# Patient Record
Sex: FEMALE | Race: BLACK OR AFRICAN AMERICAN | NOT HISPANIC OR LATINO | Employment: FULL TIME | URBAN - METROPOLITAN AREA
[De-identification: names, ages, dates, MRNs, and addresses within clinical notes are randomized per-mention and may not be internally consistent; named-entity substitution may affect disease eponyms.]

---

## 2023-07-10 ENCOUNTER — OFFICE VISIT (OUTPATIENT)
Dept: PRIMARY CARE | Facility: CLINIC | Age: 38
End: 2023-07-10
Payer: COMMERCIAL

## 2023-07-10 VITALS
TEMPERATURE: 97.7 F | SYSTOLIC BLOOD PRESSURE: 113 MMHG | HEART RATE: 91 BPM | WEIGHT: 244.3 LBS | BODY MASS INDEX: 39.43 KG/M2 | DIASTOLIC BLOOD PRESSURE: 79 MMHG | OXYGEN SATURATION: 97 %

## 2023-07-10 DIAGNOSIS — Z30.9 ENCOUNTER FOR CONTRACEPTIVE MANAGEMENT, UNSPECIFIED TYPE: Primary | ICD-10-CM

## 2023-07-10 DIAGNOSIS — G40.909 SEIZURE DISORDER (MULTI): ICD-10-CM

## 2023-07-10 DIAGNOSIS — F48.9 POOR MENTAL HEALTH: ICD-10-CM

## 2023-07-10 DIAGNOSIS — F41.9 ANXIETY: ICD-10-CM

## 2023-07-10 PROCEDURE — 1036F TOBACCO NON-USER: CPT

## 2023-07-10 PROCEDURE — 99203 OFFICE O/P NEW LOW 30 MIN: CPT

## 2023-07-10 RX ORDER — LACOSAMIDE 200 MG/1
TABLET ORAL 2 TIMES DAILY
COMMUNITY
Start: 2019-12-10 | End: 2023-12-04 | Stop reason: SDUPTHER

## 2023-07-10 RX ORDER — DESOGESTREL AND ETHINYL ESTRADIOL 0.15-0.03
1 KIT ORAL DAILY
COMMUNITY
Start: 2020-01-08

## 2023-07-10 ASSESSMENT — PAIN SCALES - GENERAL: PAINLEVEL: 0-NO PAIN

## 2023-07-10 NOTE — PATIENT INSTRUCTIONS
Thank you for coming in to see us today, Ms. Carmen Crook! It was a pleasure managing your health together.    Today in clinic, we got to know each other as this was our first appointment with each other. We also talked about the stressors in your life right now and your need for a counselor.    If we ordered bloodwork today, you can get this done at ANY  location and the results will come to me directly. The [facility] here at [facility] are walk-in (no appointment needed); [facility]'s hours are M-F 6:30a-6p and Sat 8a-12p.    If you have any questions/concerns, you can always use Dmailer to message me directly. Or if you prefer, you can call the office at [phone]; if you leave a message, the office staff should translate this to a message in my inbox.    I'm looking forward to seeing you back in clinic in 2 weeks to insert a nexplanon and do a pap smear.     Best,  Dr. Valenzuela

## 2023-07-10 NOTE — PROGRESS NOTES
Subjective   Patient ID: Carmen Crook is a 38 y.o. female who presents for New Patient Visit (Establish care).    HPI  38 year old female here to establish care with a new PCP. patient doing well with concern for life stressors and would like a referral to a therapist at this time.     #stress and anxiety   - states that taking care of 5 children, working full time, and going to nursing school is causing her increased stress.   - endorses guilt at not being able to be the best mother to her children. States that she can be irritable due to stress.   - PHQ-9 -ve     #Hx of seizures  - stated that her first seizure occurred In 2017. Occurred at time her   and pt had increased stress.   - grand mal seizures.   - states that she was initially started on keppra but had recurrent seizures from 9400-1698 q4fjlbph.   - pt switched from keppra to vimpat 200 mg BID. Currently stable on this dose with no seizures since being on this medication.     Previous PCP: Mahi Pavon     OBSTETRIC HISTORY:  G/P:    Abortions: x2  Vaginal Delivery: x 5     GYNECOLOGICAL HISTORY:  Menarche: Lasts 6 days. Light menstruation on day 1-2, heaviest on day 3-5, light on day 6. Uses 5-6 pads/day on heaviest day. Changes tampon q3h.   LMP: 23   Intermenstrual bleeding: no  Pelvic pain: tolerated with ibuprofen   Breast/Ovarian/Uterine CA: denies  Last PAP:   History of Abnormal PAP: denies     PAST MEDICAL HISTORY:  Seizure disorder     PAST SURGICAL HISTORY:  Denies    FAMILY HISTORY:  - mother- recurring DVT/ PE   - sister- uterine fibroids  - maternal grandmother- lung cancer  - cousin on mom's side- cancer behind eye  - 4 year old child with grand mal seizures.     SOCIAL HISTORY:  Tobacco: smoked for a couple of months when she was young but denies otherwise.   ETOH: socially  Drug: prior marijuana use.   Sexual hx: UTI, chlamydia and trichomonas in past.   Occasionally uses condoms. Pt has tried OCP's, IUD,  nexplanon, contraception patch, depo shot. Pills until nexplanon.   Occupation: work in  [employer].     ALLERGIES:  NKDA     RECENT HOSPITALIZATIONS:  - denies     RECENT PROCEDURES:  - denies       Review of Systems  12 point ROS negative except as stated in HPI.     Objective   Physical Exam  Gen: NAD, nontox  HEENT: NCAT. EOMI. MMM.  RESP: no resp distress, talks in full sentences, CTABL  CVS: non-tachycardic, RRR  ABD: Soft, non-distended  Psych: appropriately answers questions. normal mood and affect  MSK: appears to have Full range of motion on all extremities.     Assessment/Plan   38 year old female here to establish care with a new PCP. patient doing well with concern for life stressors and would like a referral to a therapist at this time.     #stress and anxiety   - pt experiencing increased stress in last year with taking care of 5 children, working full time, and going to nursing school.   - pt also states that she has a hx of trauma that she would like to speak to somebody about. States that she was previously seeing therapist which helped.   - no SI/ HI  - referred to in house psychologist Dr. Steinberg for therapy sessions.     #contraception management   - pt states that she occasionally uses condoms with her one sexual partner. Pt does not desire pregnancy at this time.   - states that she has used different contraceptives in the past but would prefer the nexplanon   - pt will make appointment in 2 weeks for nexplanon insertion and for PAP smear.     #seizure disorder   - c/w vimpat 200 mg BID     Discussed with Dr. Matias Valenzuela MD, MPH   Family Medicine and Preventive Health, PGY-2             This note was completed using Dragon voice recognition technology and may include unintended errors with respect to translation of words, typographical errors or grammar errors which may not have been identified while finalizing the chart.        (2) assistive person

## 2023-07-14 ENCOUNTER — APPOINTMENT (OUTPATIENT)
Dept: PRIMARY CARE | Facility: CLINIC | Age: 38
End: 2023-07-14
Payer: COMMERCIAL

## 2023-07-31 NOTE — PROGRESS NOTES
I reviewed with the resident the medical history and the resident’s findings on physical examination.  I discussed with the resident the patient’s diagnosis and concur with the treatment plan as documented in the resident note.     Demar Salcedo MD

## 2023-08-08 ENCOUNTER — TELEPHONE (OUTPATIENT)
Dept: PRIMARY CARE | Facility: CLINIC | Age: 38
End: 2023-08-08
Payer: COMMERCIAL

## 2023-08-08 NOTE — TELEPHONE ENCOUNTER
Pt was called but got voicemail. Left a message stating that pt can call the  and give some availabilities for me to call her back. Will discuss patient's sinus infection at that time.

## 2023-08-09 ENCOUNTER — TELEPHONE (OUTPATIENT)
Dept: PRIMARY CARE | Facility: CLINIC | Age: 38
End: 2023-08-09
Payer: COMMERCIAL

## 2023-09-01 ENCOUNTER — TELEPHONE (OUTPATIENT)
Dept: PRIMARY CARE | Facility: CLINIC | Age: 38
End: 2023-09-01

## 2023-12-04 ENCOUNTER — PHARMACY VISIT (OUTPATIENT)
Dept: PHARMACY | Facility: CLINIC | Age: 38
End: 2023-12-04
Payer: MEDICAID

## 2023-12-04 ENCOUNTER — OFFICE VISIT (OUTPATIENT)
Dept: PRIMARY CARE | Facility: HOSPITAL | Age: 38
End: 2023-12-04
Payer: COMMERCIAL

## 2023-12-04 VITALS
TEMPERATURE: 98.5 F | HEART RATE: 91 BPM | DIASTOLIC BLOOD PRESSURE: 80 MMHG | SYSTOLIC BLOOD PRESSURE: 112 MMHG | OXYGEN SATURATION: 98 %

## 2023-12-04 DIAGNOSIS — J18.9 PNEUMONIA DUE TO INFECTIOUS ORGANISM, UNSPECIFIED LATERALITY, UNSPECIFIED PART OF LUNG: ICD-10-CM

## 2023-12-04 DIAGNOSIS — U07.1 COVID-19: Primary | ICD-10-CM

## 2023-12-04 DIAGNOSIS — J11.1 FLU: ICD-10-CM

## 2023-12-04 DIAGNOSIS — Z30.9 ENCOUNTER FOR CONTRACEPTIVE MANAGEMENT, UNSPECIFIED TYPE: ICD-10-CM

## 2023-12-04 DIAGNOSIS — G40.909 SEIZURE DISORDER (MULTI): ICD-10-CM

## 2023-12-04 PROCEDURE — RXMED WILLOW AMBULATORY MEDICATION CHARGE

## 2023-12-04 PROCEDURE — 87636 SARSCOV2 & INF A&B AMP PRB: CPT

## 2023-12-04 PROCEDURE — 99213 OFFICE O/P EST LOW 20 MIN: CPT | Mod: GC

## 2023-12-04 PROCEDURE — 1036F TOBACCO NON-USER: CPT

## 2023-12-04 PROCEDURE — 99213 OFFICE O/P EST LOW 20 MIN: CPT

## 2023-12-04 PROCEDURE — 99000 SPECIMEN HANDLING OFFICE-LAB: CPT

## 2023-12-04 RX ORDER — ACETAMINOPHEN 500 MG
1000 TABLET ORAL EVERY 8 HOURS PRN
Qty: 30 TABLET | Refills: 0 | Status: SHIPPED | OUTPATIENT
Start: 2023-12-04 | End: 2023-12-14

## 2023-12-04 RX ORDER — BENZONATATE 100 MG/1
100 CAPSULE ORAL 3 TIMES DAILY PRN
Qty: 15 CAPSULE | Refills: 0 | Status: SHIPPED | OUTPATIENT
Start: 2023-12-04 | End: 2023-12-13

## 2023-12-04 RX ORDER — LACOSAMIDE 200 MG/1
200 TABLET ORAL 2 TIMES DAILY
Qty: 30 TABLET | Refills: 2 | Status: SHIPPED | OUTPATIENT
Start: 2023-12-04 | End: 2024-01-24 | Stop reason: SDUPTHER

## 2023-12-04 RX ORDER — IBUPROFEN 800 MG/1
400 TABLET ORAL EVERY 8 HOURS PRN
Qty: 180 TABLET | Refills: 3 | Status: SHIPPED | OUTPATIENT
Start: 2023-12-04 | End: 2024-12-03

## 2023-12-04 NOTE — PROGRESS NOTES
Carmen Crook is a 38 y.o. with PMH of Epilepsy(last seizure in 2021)seen at [facility] on 12/04/23 for flu-like symptoms    Subjective:  Presenting with flu like symptoms-stuffy nose, non-productive cough, aches and pain, cold and chills and headaches started on Saturday 12/02/2023. Headache severity was about 8 took theraflu, so currently no headache, denies any n/v, CP, however has poor appetite  Reports son coughing last week-dry cough,but generally fine.   Had flu shot this year, fully vaccinated, had covid booster in July, hs a previous hx of Covis infection      ROS:   12 point review of system unremarkable except as stated above    Current Outpatient Medications:     desogestreL-ethinyl estradioL (Apri) 0.15-0.03 mg tablet, Take 1 tablet by mouth once daily., Disp: , Rfl:     lacosamide (Vimpat) 200 mg tablet tablet, Take by mouth 2 times a day., Disp: , Rfl:   Pharmacy: [facility]      PHYSICAL EXAM:   General: well appearing, NAD, pleasant and engaged in encounter    HEENT: NCAT, MMM  CV: RRR, no m/r/g, cap r  PULM: CTAB, non-labored respirations, no wheezes/crackles/rhonchi auscultated  ABD: soft, NT, ND, no guarding or rebound tenderness  : no suprapubic or CVA tenderness   EXT: WWP, no significant edema noted in b/l LE's  SKIN: no rashes or lesions noted on trunk or extremities   NEURO: A&Ox4, symmetric facies, no gross motor or sensory deficits, normal gait  PSYCH: pleasant mood, appropriate affect for situation and context    Labs:  Recent Labs     03/24/23  1219 05/17/20  1834 09/25/19  1752   WBC 6.2 9.1 9.0   HGB 12.8 12.1 12.3   HCT 38.5 36.8 38.5    315 276     Recent Labs     03/24/23  1219 05/17/20  1834 09/25/19  1752    142 140   K 4.4 4.0 4.1    106 104   CO2 27 28 25   BUN 10 9 9   CREATININE 0.84 0.82 0.72   GLUCOSE 74 80 93   CALCIUM 9.6 9.7 9.2   PHOS  --   --  3.0       Assessment & Plan:  Carmen MADISON Crook is a 38 y.o. with PMH of Epilepsy seen at [facility] on 12/04/23 for flu like symptoms with concerns for Covid.    # URTI  # Possible Covid vs flu  Pt mildly symptomatic  -Covid test, flu test, follow up labs  -Will treat symptomatically and monitor  -Tylenol 1g tid prn, Motrin 400mh tid prn,   -tessalon perles for cough and sorethroat  -will give 2 day off work duty until results  -Observe isolation precautions    #seizure disorder   - c/w vimpat 200 mg BID       #Health Maintenance  Pt follows up with Family physician Dr Garcia (will want to continue with her)  -to continue health maintenance with Family physician    Addendum  12/5/2023  Pt called about her flu and covid results-they were all negative. Reports she still does not feel better, some mild wheezing however denies SOB, fever, worsening cough. Say she has some remote hx of Asthma however not on albuterol. Will want some few days of to feel better  Plan:  -Counselled to report to the ED if symptoms worse and getting SOB  -c/w symptomatic treatment  -Will do CXR if symptoms get worse and have concerns for PNa  -will give 2 more extra work off duty.    Karen Walker-Dimple  Med-PGY2

## 2023-12-04 NOTE — PATIENT INSTRUCTIONS
Hi Ms Carmen, you presented with flu like symptoms and concerns for covid. You however have mild symptoms and we will do Covid and flu test, We will give you a call when the results come in  We advice you take some fluids, continue with tylenol or theraflu and motrin for joint pains,.  Pls observe isolation precautions until results are negative.  Will give you off work duty for 2 days until results.    It was a pleasure taking care of you.  Continue follow up with your Family physician

## 2023-12-04 NOTE — LETTER
December 5, 2023     Patient: Carmen Crook   YOB: 1985   Date of Visit: 12/4/2023       To Whom It May Concern:    Carmen Crook was seen in my clinic on 12/4/2023 at 3:30 pm. Please excuse Carmen for her absence from work for 2 days 12/5 to 12/6.    If you have any questions or concerns, please don't hesitate to call.         Sincerely,         Karen Culver MD        CC: No Recipients

## 2023-12-04 NOTE — LETTER
December 5, 2023     Patient: Carmen Crook   YOB: 1985   Date of Visit: 12/4/2023       To Whom It May Concern:    Carmen Crook was seen in my clinic on 12/4/2023 at 3:30 pm. Please excuse Carmen for absence from work on 12/5/2023 to 12/7/2023.    If you have any questions or concerns, please don't hesitate to call.         Sincerely,         Karen Culver MD        CC: No Recipients

## 2023-12-05 LAB
FLUAV RNA RESP QL NAA+PROBE: NOT DETECTED
FLUBV RNA RESP QL NAA+PROBE: NOT DETECTED
SARS-COV-2 RNA RESP QL NAA+PROBE: NOT DETECTED

## 2023-12-05 NOTE — PROGRESS NOTES
Telephone note    Pt informed of her covid and flu test results which were negative.  Still not feeling better, will want some more extra work days   -Advised to continue with analgesics and symptomatic management.  - Can report to ED if symptom worsen  -Give excuse duty 12/5-12/7/2023

## 2024-01-24 ENCOUNTER — PHARMACY VISIT (OUTPATIENT)
Dept: PHARMACY | Facility: CLINIC | Age: 39
End: 2024-01-24
Payer: MEDICAID

## 2024-01-24 ENCOUNTER — OFFICE VISIT (OUTPATIENT)
Dept: PRIMARY CARE | Facility: CLINIC | Age: 39
End: 2024-01-24
Payer: COMMERCIAL

## 2024-01-24 VITALS
SYSTOLIC BLOOD PRESSURE: 112 MMHG | TEMPERATURE: 96.6 F | HEART RATE: 73 BPM | BODY MASS INDEX: 38.39 KG/M2 | OXYGEN SATURATION: 98 % | DIASTOLIC BLOOD PRESSURE: 76 MMHG | HEIGHT: 67 IN | WEIGHT: 244.6 LBS

## 2024-01-24 DIAGNOSIS — Z13.220 LIPID SCREENING: ICD-10-CM

## 2024-01-24 DIAGNOSIS — R30.9 PAINFUL URINATION: ICD-10-CM

## 2024-01-24 DIAGNOSIS — R35.0 INCREASED FREQUENCY OF URINATION: ICD-10-CM

## 2024-01-24 DIAGNOSIS — Z13.1 DIABETES MELLITUS SCREENING: ICD-10-CM

## 2024-01-24 DIAGNOSIS — Z11.59 ENCOUNTER FOR HEPATITIS C SCREENING TEST FOR LOW RISK PATIENT: ICD-10-CM

## 2024-01-24 DIAGNOSIS — F41.9 ANXIETY: ICD-10-CM

## 2024-01-24 DIAGNOSIS — G40.909 SEIZURE DISORDER (MULTI): ICD-10-CM

## 2024-01-24 DIAGNOSIS — Z32.00 ENCOUNTER FOR PREGNANCY TEST, RESULT UNKNOWN: Primary | ICD-10-CM

## 2024-01-24 PROBLEM — R51.9 HEADACHE: Status: ACTIVE | Noted: 2024-01-24

## 2024-01-24 LAB
POC APPEARANCE, URINE: CLEAR
POC BILIRUBIN, URINE: NEGATIVE
POC BLOOD, URINE: NEGATIVE
POC COLOR, URINE: YELLOW
POC GLUCOSE, URINE: NEGATIVE MG/DL
POC KETONES, URINE: NEGATIVE MG/DL
POC LEUKOCYTES, URINE: ABNORMAL
POC NITRITE,URINE: NEGATIVE
POC PH, URINE: 7 PH
POC PROTEIN, URINE: NEGATIVE MG/DL
POC SPECIFIC GRAVITY, URINE: 1.02
POC UROBILINOGEN, URINE: 0.2 EU/DL
PREGNANCY TEST URINE, POC: POSITIVE

## 2024-01-24 PROCEDURE — RXMED WILLOW AMBULATORY MEDICATION CHARGE

## 2024-01-24 PROCEDURE — 87661 TRICHOMONAS VAGINALIS AMPLIF: CPT

## 2024-01-24 PROCEDURE — 99213 OFFICE O/P EST LOW 20 MIN: CPT

## 2024-01-24 PROCEDURE — 81025 URINE PREGNANCY TEST: CPT

## 2024-01-24 PROCEDURE — 1036F TOBACCO NON-USER: CPT

## 2024-01-24 PROCEDURE — 81003 URINALYSIS AUTO W/O SCOPE: CPT

## 2024-01-24 RX ORDER — LACOSAMIDE 200 MG/1
200 TABLET ORAL 2 TIMES DAILY
Qty: 30 TABLET | Refills: 2 | Status: SHIPPED | OUTPATIENT
Start: 2024-01-24 | End: 2024-03-09

## 2024-01-24 RX ORDER — ALBUTEROL SULFATE 90 UG/1
2 AEROSOL, METERED RESPIRATORY (INHALATION) EVERY 4 HOURS PRN
COMMUNITY
Start: 2021-06-08

## 2024-01-24 ASSESSMENT — PAIN SCALES - GENERAL: PAINLEVEL: 0-NO PAIN

## 2024-01-24 ASSESSMENT — ENCOUNTER SYMPTOMS
DIAPHORESIS: 1
DYSURIA: 1
FATIGUE: 1
SORE THROAT: 0
FREQUENCY: 1
DIZZINESS: 0
ABDOMINAL PAIN: 1
WEAKNESS: 0
HEADACHES: 0
SHORTNESS OF BREATH: 0
WHEEZING: 0

## 2024-01-24 ASSESSMENT — VISUAL ACUITY: OU: 1

## 2024-01-24 NOTE — PROGRESS NOTES
"Subjective   Patient ID: Carmen Crook is a 38 y.o. female who presents for Abdominal Pain (Suspect she may have a bladder infection, and would like a pregnancy test./Pt would also like a refill for vimpat.).    Ms. Crook is a 39yo F with PMH of epilepsy (managed with vimpat, no seizures in 3 years) who presented today with concern for UTI and possible pregnancy. Patient was sexually active with a new partner at the end of December, endorses no use of contraception. Last menstrual period started 12/16, and she has not menstruated since. Patient is endorsing dysuria, increased urinary frequency, urgency. Pt also endorses some burning with urination and mild lower back pain, as well as nausea and fatigue. Pt denies vomiting, constipation, diarrhea. Patient has had 5 previous pregnancies, all uncomplicated, to term, and delivered vaginally. Patient also expressed desire to be referred to psychologist.     Abdominal Pain  Associated symptoms include dysuria and frequency. Pertinent negatives include no headaches.        Review of Systems   Constitutional:  Positive for diaphoresis and fatigue.   HENT:  Negative for hearing loss and sore throat.    Eyes:  Negative for visual disturbance.   Respiratory:  Negative for shortness of breath and wheezing.    Cardiovascular:  Negative for chest pain and leg swelling.   Gastrointestinal:  Positive for abdominal pain.   Genitourinary:  Positive for dysuria, frequency, menstrual problem and vaginal discharge. Negative for vaginal bleeding and vaginal pain.   Neurological:  Negative for dizziness, weakness and headaches.   Psychiatric/Behavioral:  Negative for suicidal ideas.        Objective   /76 (BP Location: Right arm, Patient Position: Sitting, BP Cuff Size: Adult)   Pulse 73   Temp 35.9 °C (96.6 °F) (Temporal)   Ht 1.702 m (5' 7\")   Wt 111 kg (244 lb 9.6 oz)   SpO2 98%   BMI 38.31 kg/m²     Physical Exam  Constitutional:       General: She is not in acute " distress.  HENT:      Head: Normocephalic and atraumatic.   Eyes:      General: Vision grossly intact.      Extraocular Movements: Extraocular movements intact.      Conjunctiva/sclera: Conjunctivae normal.   Cardiovascular:      Rate and Rhythm: Normal rate and regular rhythm.      Pulses: Normal pulses.      Heart sounds: No murmur heard.     No gallop.   Pulmonary:      Effort: Pulmonary effort is normal. No respiratory distress.      Breath sounds: Normal breath sounds. No stridor. No wheezing.   Chest:      Chest wall: No tenderness.   Abdominal:      General: Abdomen is flat. There is no distension.      Palpations: Abdomen is soft. There is no mass.      Tenderness: There is no abdominal tenderness. There is no guarding.   Musculoskeletal:         General: No tenderness.      Right lower leg: No edema.      Left lower leg: No edema.   Skin:     General: Skin is warm and dry.   Neurological:      General: No focal deficit present.      Mental Status: She is alert and oriented to person, place, and time.   Psychiatric:         Attention and Perception: Attention normal.         Mood and Affect: Mood normal.         Behavior: Behavior normal.         Assessment/Plan   Problem List Items Addressed This Visit    None  Visit Diagnoses       Encounter for pregnancy test, result unknown    -  Primary    Relevant Medications    prenatal vitamin, iron-folic, 27 mg iron-800 mcg folic acid tablet    Other Relevant Orders    POCT Pregnancy, Urine manually resulted (Completed)    CBC and Auto Differential    Seizure disorder (CMS/HCC)        Relevant Medications    lacosamide (Vimpat) 200 mg tablet tablet    Increased frequency of urination        Relevant Orders    POCT UA Automated manually resulted (Completed)    Painful urination        Relevant Orders    POCT UA Automated manually resulted (Completed)    Trichomonas vaginalis, Amplified    C. trachomatis / N. gonorrhoeae, DNA probe    Lipid screening        Relevant  Orders    Lipid panel    Diabetes mellitus screening        Relevant Orders    Hemoglobin A1c    Encounter for hepatitis C screening test for low risk patient        Relevant Orders    Hepatitis C antibody    Anxiety        Relevant Orders    Referral to Psychology          Ms. Crook is a 39yo F with PMH of epilepsy (managed with vimpat, no seizures in 3 years) who presented today with concern for UTI and possible pregnancy. Patient was sexually active with a new partner at the end of December, endorses no use of contraception. Last menstrual period started , and she has not menstruated since. Urine collected in clinic, negative for UTI, positive hcg.     #Pregnancy  #c/f UTI  ::concern for UTI and pregnancy given patients current urinary symptoms and late menstrual cycle, last cycle started   ::patient not currently on any contraception  ::UA negative for UTI  ::urine pregnancy test positive  ::patient expressed wanting to take some time to think about making decision on continuing with pregnancy, counseled on possible options  Plan:  - urine STI panel pending  - patient provided with resources for planned parenthood and   - prenatal vitamin  - CBC  - follow up in 1 week to discuss patients plan    #Epilepsy  ::Diagnosed in  following grandmal seizures  ::Patient was originially put on Keppra, but switched to vimpat due to mood disturbances caused by keppra  ::epilepsy well controlled, no seizures since   Plan:  - refill vimpat  - follow up with neurology    #Psych concerns  ::Patient requesting referral to psychologist    #Screening  - ordered A1c and Hep C labs    I was present with the medical student for the service. I personally verified performed the physical examination and medical decision making. I have verified all of the medical student’s documentation for this encounter and made edits as necessary.     Pt seen and discussed with Dr. Chilango Vernon MD  Family Medicine -  PGY 1

## 2024-01-25 LAB — T VAGINALIS RRNA SPEC QL NAA+PROBE: POSITIVE

## 2024-01-26 DIAGNOSIS — A59.9 TRICHOMONAS VAGINALIS INFECTION: Primary | ICD-10-CM

## 2024-01-26 DIAGNOSIS — Z3A.01 LESS THAN 8 WEEKS GESTATION OF PREGNANCY (HHS-HCC): ICD-10-CM

## 2024-01-26 PROCEDURE — RXMED WILLOW AMBULATORY MEDICATION CHARGE

## 2024-01-26 RX ORDER — METRONIDAZOLE 500 MG/1
500 TABLET ORAL 2 TIMES DAILY
Qty: 14 TABLET | Refills: 0 | Status: SHIPPED | OUTPATIENT
Start: 2024-01-26 | End: 2024-02-05

## 2024-01-26 RX ORDER — ONDANSETRON 4 MG/1
4 TABLET, ORALLY DISINTEGRATING ORAL EVERY 8 HOURS PRN
Qty: 20 TABLET | Refills: 0 | Status: SHIPPED | OUTPATIENT
Start: 2024-01-26 | End: 2024-02-05

## 2024-01-26 NOTE — PROGRESS NOTES
Spoke to patient on the phone to discuss recent lab testing. Discussed positive Trichomonas test and need for antibiotics. Pt agreeable and prescription sent for metronidazole, as well as additional STI testing. Pt additionally requesting medication for nausea, will be sent Zofran.     Pt states she will reach out within one week to follow up.     Vu Vernon MD  Family Medicine - PGY 1

## 2024-01-29 ENCOUNTER — PHARMACY VISIT (OUTPATIENT)
Dept: PHARMACY | Facility: CLINIC | Age: 39
End: 2024-01-29
Payer: MEDICAID

## 2024-01-29 NOTE — PROGRESS NOTES
I saw and evaluated the patient. I personally obtained the key and critical portions of the history and physical exam or was physically present for key and critical portions performed by the resident/fellow. I reviewed the resident/fellow's documentation and discussed the patient with the resident/fellow. I agree with the resident/fellow's medical decision making as documented in the note with the exception/addition of the following:    If patient decides to continue pregnancy, need for Vimpat will require enlisting in national registry since inadequate nkechi data is availab;e to assess frisks of its use. We will continue to follow up with patient and refer to maternal-fetal health is she elects to continue. Neurology/epiliepsy referral would also be helpful if alternative to Vimpat is required.       Lashay Kee MD

## 2024-02-07 ENCOUNTER — TELEPHONE (OUTPATIENT)
Dept: PRIMARY CARE | Facility: CLINIC | Age: 39
End: 2024-02-07

## 2024-02-07 NOTE — TELEPHONE ENCOUNTER
Patient called and requested a callback about her medication she stated that its not working correctly and wanted to address some concerns about this please give the patient a callback about this matter [phone]

## 2024-02-11 ENCOUNTER — APPOINTMENT (OUTPATIENT)
Dept: RADIOLOGY | Facility: HOSPITAL | Age: 39
End: 2024-02-11
Payer: COMMERCIAL

## 2024-02-11 ENCOUNTER — CLINICAL SUPPORT (OUTPATIENT)
Dept: EMERGENCY MEDICINE | Facility: HOSPITAL | Age: 39
End: 2024-02-11
Payer: COMMERCIAL

## 2024-02-11 ENCOUNTER — HOSPITAL ENCOUNTER (EMERGENCY)
Facility: HOSPITAL | Age: 39
Discharge: HOME | End: 2024-02-11
Payer: COMMERCIAL

## 2024-02-11 VITALS
HEART RATE: 91 BPM | OXYGEN SATURATION: 97 % | HEIGHT: 67 IN | RESPIRATION RATE: 18 BRPM | DIASTOLIC BLOOD PRESSURE: 85 MMHG | TEMPERATURE: 97.7 F | WEIGHT: 218 LBS | SYSTOLIC BLOOD PRESSURE: 120 MMHG | BODY MASS INDEX: 34.21 KG/M2

## 2024-02-11 DIAGNOSIS — O23.41 URINARY TRACT INFECTION IN MOTHER DURING FIRST TRIMESTER OF PREGNANCY (HHS-HCC): ICD-10-CM

## 2024-02-11 DIAGNOSIS — O21.9 NAUSEA AND VOMITING DURING PREGNANCY (HHS-HCC): Primary | ICD-10-CM

## 2024-02-11 LAB
ALBUMIN SERPL BCP-MCNC: 4.2 G/DL (ref 3.4–5)
ALP SERPL-CCNC: 46 U/L (ref 33–110)
ALT SERPL W P-5'-P-CCNC: 7 U/L (ref 7–45)
ANION GAP SERPL CALC-SCNC: 16 MMOL/L (ref 10–20)
APPEARANCE UR: ABNORMAL
AST SERPL W P-5'-P-CCNC: 11 U/L (ref 9–39)
ATRIAL RATE: 72 BPM
BACTERIA #/AREA URNS AUTO: ABNORMAL /HPF
BASOPHILS # BLD AUTO: 0.03 X10*3/UL (ref 0–0.1)
BASOPHILS NFR BLD AUTO: 0.3 %
BILIRUB SERPL-MCNC: 0.8 MG/DL (ref 0–1.2)
BILIRUB UR STRIP.AUTO-MCNC: NEGATIVE MG/DL
BUN SERPL-MCNC: 8 MG/DL (ref 6–23)
CALCIUM SERPL-MCNC: 10.3 MG/DL (ref 8.6–10.6)
CARDIAC TROPONIN I PNL SERPL HS: 5 NG/L (ref 0–34)
CHLORIDE SERPL-SCNC: 98 MMOL/L (ref 98–107)
CO2 SERPL-SCNC: 19 MMOL/L (ref 21–32)
COLOR UR: YELLOW
CREAT SERPL-MCNC: 0.69 MG/DL (ref 0.5–1.05)
EGFRCR SERPLBLD CKD-EPI 2021: >90 ML/MIN/1.73M*2
EOSINOPHIL # BLD AUTO: 0.04 X10*3/UL (ref 0–0.7)
EOSINOPHIL NFR BLD AUTO: 0.4 %
ERYTHROCYTE [DISTWIDTH] IN BLOOD BY AUTOMATED COUNT: 11.7 % (ref 11.5–14.5)
GLUCOSE SERPL-MCNC: 84 MG/DL (ref 74–99)
GLUCOSE UR STRIP.AUTO-MCNC: NORMAL MG/DL
HCG UR QL IA.RAPID: POSITIVE
HCT VFR BLD AUTO: 39.4 % (ref 36–46)
HGB BLD-MCNC: 14.2 G/DL (ref 12–16)
HYALINE CASTS #/AREA URNS AUTO: ABNORMAL /LPF
IMM GRANULOCYTES # BLD AUTO: 0.05 X10*3/UL (ref 0–0.7)
IMM GRANULOCYTES NFR BLD AUTO: 0.5 % (ref 0–0.9)
KETONES UR STRIP.AUTO-MCNC: ABNORMAL MG/DL
LEUKOCYTE ESTERASE UR QL STRIP.AUTO: ABNORMAL
LIPASE SERPL-CCNC: 27 U/L (ref 9–82)
LYMPHOCYTES # BLD AUTO: 2.28 X10*3/UL (ref 1.2–4.8)
LYMPHOCYTES NFR BLD AUTO: 22.9 %
MCH RBC QN AUTO: 31.2 PG (ref 26–34)
MCHC RBC AUTO-ENTMCNC: 36 G/DL (ref 32–36)
MCV RBC AUTO: 87 FL (ref 80–100)
MONOCYTES # BLD AUTO: 0.65 X10*3/UL (ref 0.1–1)
MONOCYTES NFR BLD AUTO: 6.5 %
MUCOUS THREADS #/AREA URNS AUTO: ABNORMAL /LPF
NEUTROPHILS # BLD AUTO: 6.9 X10*3/UL (ref 1.2–7.7)
NEUTROPHILS NFR BLD AUTO: 69.4 %
NITRITE UR QL STRIP.AUTO: NEGATIVE
NRBC BLD-RTO: 0 /100 WBCS (ref 0–0)
P AXIS: 37 DEGREES
P OFFSET: 190 MS
P ONSET: 153 MS
PH UR STRIP.AUTO: 6 [PH]
PLATELET # BLD AUTO: 230 X10*3/UL (ref 150–450)
POTASSIUM SERPL-SCNC: 3.4 MMOL/L (ref 3.5–5.3)
PR INTERVAL: 140 MS
PROT SERPL-MCNC: 8.8 G/DL (ref 6.4–8.2)
PROT UR STRIP.AUTO-MCNC: ABNORMAL MG/DL
Q ONSET: 223 MS
QRS COUNT: 12 BEATS
QRS DURATION: 62 MS
QT INTERVAL: 388 MS
QTC CALCULATION(BAZETT): 424 MS
QTC FREDERICIA: 412 MS
R AXIS: 89 DEGREES
RBC # BLD AUTO: 4.55 X10*6/UL (ref 4–5.2)
RBC # UR STRIP.AUTO: ABNORMAL /UL
RBC #/AREA URNS AUTO: ABNORMAL /HPF
SODIUM SERPL-SCNC: 130 MMOL/L (ref 136–145)
SP GR UR STRIP.AUTO: 1.03
SQUAMOUS #/AREA URNS AUTO: ABNORMAL /HPF
T AXIS: 80 DEGREES
T OFFSET: 417 MS
UROBILINOGEN UR STRIP.AUTO-MCNC: ABNORMAL MG/DL
VENTRICULAR RATE: 72 BPM
WBC # BLD AUTO: 10 X10*3/UL (ref 4.4–11.3)
WBC #/AREA URNS AUTO: ABNORMAL /HPF

## 2024-02-11 PROCEDURE — 36415 COLL VENOUS BLD VENIPUNCTURE: CPT | Performed by: NURSE PRACTITIONER

## 2024-02-11 PROCEDURE — 81001 URINALYSIS AUTO W/SCOPE: CPT | Performed by: NURSE PRACTITIONER

## 2024-02-11 PROCEDURE — 99284 EMERGENCY DEPT VISIT MOD MDM: CPT | Mod: 25

## 2024-02-11 PROCEDURE — 76801 OB US < 14 WKS SINGLE FETUS: CPT

## 2024-02-11 PROCEDURE — 80053 COMPREHEN METABOLIC PANEL: CPT | Performed by: NURSE PRACTITIONER

## 2024-02-11 PROCEDURE — 83690 ASSAY OF LIPASE: CPT | Performed by: NURSE PRACTITIONER

## 2024-02-11 PROCEDURE — 99285 EMERGENCY DEPT VISIT HI MDM: CPT | Performed by: NURSE PRACTITIONER

## 2024-02-11 PROCEDURE — 84484 ASSAY OF TROPONIN QUANT: CPT | Performed by: NURSE PRACTITIONER

## 2024-02-11 PROCEDURE — 85025 COMPLETE CBC W/AUTO DIFF WBC: CPT | Performed by: NURSE PRACTITIONER

## 2024-02-11 PROCEDURE — 81025 URINE PREGNANCY TEST: CPT | Performed by: NURSE PRACTITIONER

## 2024-02-11 PROCEDURE — 76815 OB US LIMITED FETUS(S): CPT | Performed by: RADIOLOGY

## 2024-02-11 PROCEDURE — 87086 URINE CULTURE/COLONY COUNT: CPT | Performed by: NURSE PRACTITIONER

## 2024-02-11 PROCEDURE — 2500000002 HC RX 250 W HCPCS SELF ADMINISTERED DRUGS (ALT 637 FOR MEDICARE OP, ALT 636 FOR OP/ED): Mod: SE | Performed by: NURSE PRACTITIONER

## 2024-02-11 PROCEDURE — 76817 TRANSVAGINAL US OBSTETRIC: CPT | Performed by: RADIOLOGY

## 2024-02-11 PROCEDURE — 93005 ELECTROCARDIOGRAM TRACING: CPT

## 2024-02-11 PROCEDURE — 96375 TX/PRO/DX INJ NEW DRUG ADDON: CPT

## 2024-02-11 PROCEDURE — 2500000004 HC RX 250 GENERAL PHARMACY W/ HCPCS (ALT 636 FOR OP/ED): Mod: SE | Performed by: NURSE PRACTITIONER

## 2024-02-11 PROCEDURE — 96365 THER/PROPH/DIAG IV INF INIT: CPT

## 2024-02-11 PROCEDURE — 99285 EMERGENCY DEPT VISIT HI MDM: CPT | Mod: 25

## 2024-02-11 RX ORDER — CEFTRIAXONE 1 G/50ML
1 INJECTION, SOLUTION INTRAVENOUS ONCE
Status: COMPLETED | OUTPATIENT
Start: 2024-02-11 | End: 2024-02-11

## 2024-02-11 RX ORDER — POTASSIUM CHLORIDE 20 MEQ/1
40 TABLET, EXTENDED RELEASE ORAL ONCE
Status: COMPLETED | OUTPATIENT
Start: 2024-02-11 | End: 2024-02-11

## 2024-02-11 RX ORDER — METOCLOPRAMIDE HYDROCHLORIDE 5 MG/ML
10 INJECTION INTRAMUSCULAR; INTRAVENOUS ONCE
Status: COMPLETED | OUTPATIENT
Start: 2024-02-11 | End: 2024-02-11

## 2024-02-11 RX ORDER — METOCLOPRAMIDE 10 MG/1
10 TABLET ORAL EVERY 6 HOURS PRN
Qty: 12 TABLET | Refills: 0 | Status: SHIPPED | OUTPATIENT
Start: 2024-02-11 | End: 2024-02-14

## 2024-02-11 RX ORDER — CEPHALEXIN 500 MG/1
500 CAPSULE ORAL 4 TIMES DAILY
Qty: 28 CAPSULE | Refills: 0 | Status: SHIPPED | OUTPATIENT
Start: 2024-02-11 | End: 2024-02-18

## 2024-02-11 RX ORDER — PROMETHAZINE HYDROCHLORIDE 25 MG/1
25 TABLET ORAL EVERY 6 HOURS PRN
Qty: 12 TABLET | Refills: 0 | Status: SHIPPED | OUTPATIENT
Start: 2024-02-11 | End: 2024-02-14

## 2024-02-11 RX ADMIN — PROMETHAZINE HYDROCHLORIDE 12.5 MG: 25 INJECTION INTRAMUSCULAR; INTRAVENOUS at 16:26

## 2024-02-11 RX ADMIN — METOCLOPRAMIDE 10 MG: 5 INJECTION, SOLUTION INTRAMUSCULAR; INTRAVENOUS at 16:27

## 2024-02-11 RX ADMIN — CEFTRIAXONE SODIUM 1 G: 1 INJECTION, SOLUTION INTRAVENOUS at 18:38

## 2024-02-11 RX ADMIN — SODIUM CHLORIDE 2000 ML: 9 INJECTION, SOLUTION INTRAVENOUS at 16:27

## 2024-02-11 RX ADMIN — POTASSIUM CHLORIDE 40 MEQ: 1500 TABLET, EXTENDED RELEASE ORAL at 18:39

## 2024-02-11 ASSESSMENT — COLUMBIA-SUICIDE SEVERITY RATING SCALE - C-SSRS
2. HAVE YOU ACTUALLY HAD ANY THOUGHTS OF KILLING YOURSELF?: NO
1. IN THE PAST MONTH, HAVE YOU WISHED YOU WERE DEAD OR WISHED YOU COULD GO TO SLEEP AND NOT WAKE UP?: NO
6. HAVE YOU EVER DONE ANYTHING, STARTED TO DO ANYTHING, OR PREPARED TO DO ANYTHING TO END YOUR LIFE?: NO

## 2024-02-11 ASSESSMENT — ENCOUNTER SYMPTOMS
VOMITING: 1
ABDOMINAL PAIN: 1
NAUSEA: 1

## 2024-02-11 ASSESSMENT — PAIN - FUNCTIONAL ASSESSMENT: PAIN_FUNCTIONAL_ASSESSMENT: 0-10

## 2024-02-11 ASSESSMENT — PAIN SCALES - GENERAL: PAINLEVEL_OUTOF10: 5 - MODERATE PAIN

## 2024-02-11 NOTE — ED PROVIDER NOTES
Emergency Department Encounter  [facility] EMERGENCY MEDICINE    Patient: Carmen Crook  MRN: [id number]  : 1985  Date of Evaluation: 2024  ED Provider: MARIA L Vallejo      Chief Complaint       Chief Complaint   Patient presents with    Pregnancy Problem        Limitations to History: none  Historian: patient  Records reviewed: EMR inpatient and outpatient notes, Care Everywhere    This is a 38-year-old female with a PMH of epilepsy who presents to the emergency room with a headache, nausea, vomiting and abdominal cramping.  Patient states that she found out she was pregnant on 2024.  She reports she developed nausea and vomiting just prior to finding out she was pregnant.  She endorses a 20 pound weight loss in the last 3 weeks.  Patient states that she passed out earlier today but does not think that she hit her head.  Patient states that she has been vomiting over 5 episodes a day. Denies any chest pain or sob. Denies any vaginal discharge or bleeding. Patient states she has not been able to tolerate food or drink in days.    PMH: Epilepsy  LMP: 23    PSH: Denies  Allergies: NKDA   Social HX: Denies smoking, alcohol or drug use.  Family HX: No family history pertinent to current presenting problem  Medications: Reviewed per EMR    ROS:     Review of Systems   Gastrointestinal:  Positive for abdominal pain, nausea and vomiting.   Neurological:  Positive for syncope.     14 systems reviewed and otherwise acutely negative except as in the [place].        Past History     Past Medical History:   Diagnosis Date    27 weeks gestation of pregnancy 2019    27 weeks gestation of pregnancy    Anemia complicating pregnancy, unspecified trimester 2019    Anemia in pregnancy    Chlamydial infection, unspecified 2016    Chlamydia infection    Diseases of the nervous system complicating pregnancy, unspecified trimester 2019    Seizure disorder  in pregnancy    Encounter for full-term uncomplicated delivery      (normal spontaneous vaginal delivery)    Encounter for other screening for genetic and chromosomal anomalies 2020    Genetic screening    Encounter for screening for infections with a predominantly sexual mode of transmission 2019    Routine screening for STI (sexually transmitted infection)    Encounter for screening for infections with a predominantly sexual mode of transmission 07/15/2016    Routine screening for STI (sexually transmitted infection)    Encounter for screening for malignant neoplasm of cervix 2016    Cervical cancer screening    Encounter for supervision of other normal pregnancy, first trimester 2019    Prenatal care, subsequent pregnancy in first trimester    Encounter for supervision of other normal pregnancy, second trimester 2019    Encounter for supervision of other normal pregnancy in second trimester    Encounter for supervision of other normal pregnancy, second trimester 2019    Prenatal care, subsequent pregnancy in second trimester    Gestational edema, unspecified trimester 2019    Swelling of lower extremity during pregnancy, antepartum    Irregular menstruation, unspecified 2019    Missed menses    Less than 8 weeks gestation of pregnancy 2016    Less than 8 weeks gestation of pregnancy    Mild hyperemesis gravidarum     Hyperemesis gravidarum    Other problems related to lifestyle 2016    Other problems related to lifestyle    Other specified personal risk factors, not elsewhere classified 2016    At risk of UTI    Other specified pregnancy related conditions, unspecified trimester 2016    Rh negative status during pregnancy    Personal history of other diseases of the digestive system 2019    History of constipation    Personal history of other diseases of the female genital tract 07/15/2016    History of amenorrhea    Personal  history of other specified conditions 06/13/2016    History of dysuria    Personal history of other specified conditions 02/11/2019    History of seizures    Unspecified asthma, uncomplicated     Childhood asthma    Unspecified asthma, uncomplicated 07/15/2016    Childhood asthma    Urinary tract infection, site not specified 06/13/2016    Acute urinary tract infection    Vomiting of pregnancy, unspecified 02/11/2019    Nausea and vomiting in pregnancy     No past surgical history on file.      Medications/Allergies     Previous Medications    ALBUTEROL 90 MCG/ACTUATION INHALER    Inhale 2 puffs every 4 hours if needed.    DESOGESTREL-ETHINYL ESTRADIOL (APRI) 0.15-0.03 MG TABLET    Take 1 tablet by mouth once daily.    IBUPROFEN 800 MG TABLET    Take 0.5 tablets (400 mg) by mouth every 8 hours if needed for mild pain (1 - 3) (pain).    LACOSAMIDE (VIMPAT) 200 MG TABLET TABLET    Take 1 tablet (200 mg) by mouth 2 times a day.    PRENATAL VITAMIN, IRON-FOLIC, 27 MG IRON-800 MCG FOLIC ACID TABLET    Take 1 tablet by mouth once daily.     No Known Allergies     Physical Exam       ED Triage Vitals [02/11/24 1500]   Temperature Heart Rate Respirations BP   36.5 °C (97.7 °F) 91 18 120/85      Pulse Ox Temp Source Heart Rate Source Patient Position   97 % Temporal -- --      BP Location FiO2 (%)     -- --       Physical Exam:    Appearance: Alert, oriented , cooperative,  in no acute distress. Well nourished & well hydrated.    Skin: Intact,  dry skin, no lesions, rash, petechiae or purpura.     Eyes: PERRLA, EOMs intact,  Conjunctiva pink with no redness or exudates.     ENT: Hearing grossly intact. External auditory canals patent, tympanic membranes intact with visible landmarks.     Neck: Supple, without meningismus.    Pulmonary: Clear bilaterally with good chest wall excursion. No rales, rhonchi or wheezing. No accessory muscle use or stridor.    Cardiac: Normal S1, S2 without murmur, rub, gallop or extrasystole.      Abdomen: Soft, nontender, active bowel sounds.  No palpable organomegaly.  No rebound or guarding.      Musculoskeletal: Full range of motion. no pain, edema, or deformity. Pulses full and equal. No cyanosis, clubbing, or edema.    Neurological: Normal sensation, no weakness, no focal findings identified.    Psychiatric: Appropriate mood and affect.       Diagnostics   Labs:  Results for orders placed or performed during the hospital encounter of 02/11/24 (from the past 24 hour(s))   Urinalysis with Reflex Culture and Microscopic   Result Value Ref Range    Color, Urine Yellow Light-Yellow, Yellow, Dark-Yellow    Appearance, Urine Turbid (N) Clear    Specific Gravity, Urine 1.029 1.005 - 1.035    pH, Urine 6.0 5.0, 5.5, 6.0, 6.5, 7.0, 7.5, 8.0    Protein, Urine 70 (1+) (A) NEGATIVE, 10 (TRACE), 20 (TRACE) mg/dL    Glucose, Urine Normal Normal mg/dL    Blood, Urine 0.06 (1+) (A) NEGATIVE    Ketones, Urine OVER (4+) (A) NEGATIVE mg/dL    Bilirubin, Urine NEGATIVE NEGATIVE    Urobilinogen, Urine 3 (1+) (A) Normal mg/dL    Nitrite, Urine NEGATIVE NEGATIVE    Leukocyte Esterase, Urine 500 Shaquille/µL (A) NEGATIVE   Microscopic Only, Urine   Result Value Ref Range    WBC, Urine 11-20 (A) 1-5, NONE /HPF    RBC, Urine 6-10 (A) NONE, 1-2, 3-5 /HPF    Squamous Epithelial Cells, Urine 10-25 (FEW) Reference range not established. /HPF    Bacteria, Urine 4+ (A) NONE SEEN /HPF    Mucus, Urine 4+ Reference range not established. /LPF    Hyaline Casts, Urine OCCASIONAL (A) NONE /LPF   Lipase   Result Value Ref Range    Lipase 27 9 - 82 U/L   Comprehensive Metabolic Panel   Result Value Ref Range    Glucose 84 74 - 99 mg/dL    Sodium 130 (L) 136 - 145 mmol/L    Potassium 3.4 (L) 3.5 - 5.3 mmol/L    Chloride 98 98 - 107 mmol/L    Bicarbonate 19 (L) 21 - 32 mmol/L    Anion Gap 16 10 - 20 mmol/L    Urea Nitrogen 8 6 - 23 mg/dL    Creatinine 0.69 0.50 - 1.05 mg/dL    eGFR >90 >60 mL/min/1.73m*2    Calcium 10.3 8.6 - 10.6 mg/dL    Albumin  "4.2 3.4 - 5.0 g/dL    Alkaline Phosphatase 46 33 - 110 U/L    Total Protein 8.8 (H) 6.4 - 8.2 g/dL    AST 11 9 - 39 U/L    Bilirubin, Total 0.8 0.0 - 1.2 mg/dL    ALT 7 7 - 45 U/L   CBC and Auto Differential   Result Value Ref Range    WBC 10.0 4.4 - 11.3 x10*3/uL    nRBC 0.0 0.0 - 0.0 /100 WBCs    RBC 4.55 4.00 - 5.20 x10*6/uL    Hemoglobin 14.2 12.0 - 16.0 g/dL    Hematocrit 39.4 36.0 - 46.0 %    MCV 87 80 - 100 fL    MCH 31.2 26.0 - 34.0 pg    MCHC 36.0 32.0 - 36.0 g/dL    RDW 11.7 11.5 - 14.5 %    Platelets 230 150 - 450 x10*3/uL    Neutrophils % 69.4 40.0 - 80.0 %    Immature Granulocytes %, Automated 0.5 0.0 - 0.9 %    Lymphocytes % 22.9 13.0 - 44.0 %    Monocytes % 6.5 2.0 - 10.0 %    Eosinophils % 0.4 0.0 - 6.0 %    Basophils % 0.3 0.0 - 2.0 %    Neutrophils Absolute 6.90 1.20 - 7.70 x10*3/uL    Immature Granulocytes Absolute, Automated 0.05 0.00 - 0.70 x10*3/uL    Lymphocytes Absolute 2.28 1.20 - 4.80 x10*3/uL    Monocytes Absolute 0.65 0.10 - 1.00 x10*3/uL    Eosinophils Absolute 0.04 0.00 - 0.70 x10*3/uL    Basophils Absolute 0.03 0.00 - 0.10 x10*3/uL   Troponin I, High Sensitivity   Result Value Ref Range    Troponin I, High Sensitivity 5 0 - 34 ng/L   hCG, Urine, Qualitative   Result Value Ref Range    HCG, Urine POSITIVE (A) NEGATIVE      Radiographs:  US PELVIS OB TRANSABDOMINAL W TRANSVAGINAL UP TO 1ST TRIMESTER    (Results Pending)       Procedures:   Procedures     EKG: interpreted by this provider  Time: 1636  Indication: NSR  Rate: 72  Rhythm: NSR        Assessment   In brief, Carmen Crook is a 38 y.o. female who presented to the emergency department with nausea and vomiting.          ED Course/MDM   Visit Vitals  /85   Pulse 91   Temp 36.5 °C (97.7 °F) (Temporal)   Resp 18   Ht 1.702 m (5' 7\")   Wt 98.9 kg (218 lb)   SpO2 97%   BMI 34.14 kg/m²   OB Status Having periods   Smoking Status Never   BSA 2.16 m²       Medications   cefTRIAXone (Rocephin) IVPB 1 g (has no administration in " time range)   potassium chloride CR (Klor-Con M20) ER tablet 40 mEq (has no administration in time range)   sodium chloride 0.9 % bolus 2,000 mL (2,000 mL intravenous New Bag 2/11/24 1627)   metoclopramide (Reglan) injection 10 mg (10 mg intravenous Given 2/11/24 1627)   promethazine (Phenergan) 12.5 mg in sodium chloride 0.9% 50 mL IV (12.5 mg intravenous New Bag 2/11/24 1626)       Patient remained stable while in the emergency department. Previous outpatient and ED records were reviewed. Outside records were reviewed.  Urine pregnancy test was positive.  IV established and labs obtained.  CBC was unremarkable.  Comprehensive metabolic panel with a potassium of 3.4, otherwise unremarkable.  Patient received oral potassium 40 mEq.  Troponin was 5.  Urinalysis shows a large amount of leukocytes 500, 11-20 white cells and 4+ bacteria.  Patient received 1 L of IV fluids, and ceftriaxone 1 g IV.  Patient declined IV Zofran and wanted something else besides Zofran for nausea vomiting.  Patient received Reglan 10 mg IV and Phenergan.  OB ultrasound was ordered, pending results at this time.  Plan is to reevaluate the patient to see if she is feeling better.    Final Impression    No diagnosis found.      DISPOSITION  Disposition: Signed out to Alba Rahman PA-C    Comment: Please note this report has been produced using speech recognition software and may contain errors related to that system including errors in grammar, punctuation, and spelling, as well as words and phrases that may be inappropriate.  If there are any questions or concerns please feel free to contact the dictating provider for clarification.    MARIA L Vallejo APRN-CNP  02/11/24 5751

## 2024-02-11 NOTE — ED TRIAGE NOTES
Pt to ED with c/o HA, n/v, cramping with no vaginal bleeding, loss of 20lbs since 1/24. Pt reports passing out in the bathroom. Confirmed pregnancy during ED visit and was seen with OB.

## 2024-02-12 LAB — HOLD SPECIMEN: NORMAL

## 2024-02-12 NOTE — PROGRESS NOTES
Emergency Medicine Transition of Care Note.    I received Carmen Crook in signout from LENIN Crocker.  Please see the previous ED provider note for all HPI, PE and MDM up to the time of signout at 1900. This is in addition to the primary record.    In brief Carmen Crook is an 38 y.o. female presenting for   Chief Complaint   Patient presents with    Pregnancy Problem     At the time of signout we were awaiting: US results and PO challenge.  Patient presents for positive pregnancy test, decreased oral intake over the past month that is caused weight loss.  Patient's UA does appear infected, urine cultures added on for completeness.  She is given IV Rocephin here as empiric treatment.  Pelvic ultrasound reveals single live IUP of 8 weeks and 0 days, benign appearing. Hypokalemia was replaced prior to discharge. Able to tolerate oral intake. Rx for reglan and promethazine as needed for n/v, daily prenatals, keflex for UTI. Take all antibiotics, as prescribed, until completely gone - even if you are feeling better. Referral placed for OB followup. Provided with 24 hour pharmacy list at discharge.    Diagnostics     Labs Reviewed   COMPREHENSIVE METABOLIC PANEL - Abnormal       Result Value    Glucose 84      Sodium 130 (*)     Potassium 3.4 (*)     Chloride 98      Bicarbonate 19 (*)     Anion Gap 16      Urea Nitrogen 8      Creatinine 0.69      eGFR >90      Calcium 10.3      Albumin 4.2      Alkaline Phosphatase 46      Total Protein 8.8 (*)     AST 11      Bilirubin, Total 0.8      ALT 7     URINALYSIS WITH REFLEX CULTURE AND MICROSCOPIC - Abnormal    Color, Urine Yellow      Appearance, Urine Turbid (*)     Specific Gravity, Urine 1.029      pH, Urine 6.0      Protein, Urine 70 (1+) (*)     Glucose, Urine Normal      Blood, Urine 0.06 (1+) (*)     Ketones, Urine OVER (4+) (*)     Bilirubin, Urine NEGATIVE      Urobilinogen, Urine 3 (1+) (*)     Nitrite, Urine NEGATIVE      Leukocyte Esterase, Urine 500 Shaquille/µL  (*)    HCG, URINE, QUALITATIVE - Abnormal    HCG, Urine POSITIVE (*)    MICROSCOPIC ONLY, URINE - Abnormal    WBC, Urine 11-20 (*)     RBC, Urine 6-10 (*)     Squamous Epithelial Cells, Urine 10-25 (FEW)      Bacteria, Urine 4+ (*)     Mucus, Urine 4+      Hyaline Casts, Urine OCCASIONAL (*)    LIPASE - Normal    Lipase 27      Narrative:     Venipuncture immediately after or during the administration of Metamizole may lead to falsely low results. Testing should be performed immediately prior to Metamizole dosing.   TROPONIN I, HIGH SENSITIVITY - Normal    Troponin I, High Sensitivity 5      Narrative:     Less than 99th percentile of normal range cutoff-  Female and children under 18 years old <35 ng/L; Male <54 ng/L: Negative  Repeat testing should be performed if clinically indicated.     Female and children under 18 years old  ng/L; Male  ng/L:  Consistent with possible cardiac damage and possible increased clinical   risk. Serial measurements may help to assess extent of myocardial damage.     >120 ng/L: Consistent with cardiac damage, increased clinical risk and  myocardial infarction. Serial measurements may help assess extent of   myocardial damage.      NOTE: Children less than 1 year old may have higher baseline troponin   levels and results should be interpreted in conjunction with the overall   clinical context.    NOTE: Troponin I testing is performed using a different   testing methodology at [facility] than at other   [facility] hospitals. Direct result comparisons should only   be made within the same method.     URINE CULTURE   CBC WITH AUTO DIFFERENTIAL    WBC 10.0      nRBC 0.0      RBC 4.55      Hemoglobin 14.2      Hematocrit 39.4      MCV 87      MCH 31.2      MCHC 36.0      RDW 11.7      Platelets 230      Neutrophils % 69.4      Immature Granulocytes %, Automated 0.5      Lymphocytes % 22.9      Monocytes % 6.5      Eosinophils % 0.4      Basophils % 0.3      Neutrophils  Absolute 6.90      Immature Granulocytes Absolute, Automated 0.05      Lymphocytes Absolute 2.28      Monocytes Absolute 0.65      Eosinophils Absolute 0.04      Basophils Absolute 0.03     URINALYSIS WITH REFLEX CULTURE AND MICROSCOPIC    Narrative:     The following orders were created for panel order Urinalysis with Reflex Culture and Microscopic.  Procedure                               Abnormality         Status                     ---------                               -----------         ------                     Urinalysis with Reflex C...[[id number]]  Abnormal            Final result               Extra Urine Gray Tube[[id number]]                            In process                   Please view results for these tests on the individual orders.   EXTRA URINE GRAY TUBE       US PELVIS OB TRANSABDOMINAL W TRANSVAGINAL UP TO 1ST TRIMESTER   Final Result   Single live intrauterine pregnancy with gestational age of 8 weeks   and 0 days based on crown-rump length. Fetal cardiac activity is   present with heart rate of 158 beats per minute. Routine fetal   anatomic survey ultrasound is recommended between 18 and 20 weeks   gestational age.        Unremarkable sonographic appearance of the right ovary.   Nonvisualization of the left ovary.        I personally reviewed the images/study and I agree with the resident   findings as stated by Tierney Nunez MD. This study was interpreted at   [place], [place].        MACRO:   None.        Signed by: Mk Best 2/11/2024 7:21 PM   Dictation workstation:   [id number]            ED Course as of 02/11/24 2002   Sun Feb 11, 2024   1820 Sign out to Alba Rahman Pa-C []      ED Course User Index  [HH] Linda Crocker, APRN-CNP         Diagnoses as of 02/11/24 2002   Nausea and vomiting during pregnancy   Urinary tract infection in mother during first trimester of pregnancy         Final diagnoses:   [O21.9] Nausea and  vomiting during pregnancy   [O23.41] Urinary tract infection in mother during first trimester of pregnancy         Alba Rahman PA-C

## 2024-02-12 NOTE — DISCHARGE INSTRUCTIONS
Take all antibiotics, as prescribed, until completely gone - even if you are feeling better.  Follow up with OB: [phone]

## 2024-02-13 LAB — BACTERIA UR CULT: ABNORMAL

## 2024-02-14 ENCOUNTER — TELEPHONE (OUTPATIENT)
Dept: PHARMACY | Facility: HOSPITAL | Age: 39
End: 2024-02-14
Payer: COMMERCIAL

## 2024-02-14 NOTE — PROGRESS NOTES
EDPD Note: Antibiotics Reviewed and Warranted    Contacted  Carmen Crook regarding a positive urine culture/result that was taken during their recent emergency room visit. I completed education with patient . The patient is being treated appropriately with keflex.     Patient is currently pregnant and was discharged on keflex 500 mg QID for 7 days. Patient reported symptoms of nausea but this is likely due to pregnancy related nausea. Patient denied all urinary symptoms except for some darker urine. Patient was advised to seek further care if symptoms present.     Susceptibility data from last 90 days.  Collected Specimen Info Organism Ampicillin Cefazolin Cefazolin (uncomplicated UTIs only) Ciprofloxacin Gentamicin Nitrofurantoin Piperacillin/Tazobactam Trimethoprim/Sulfamethoxazole   02/11/24 Urine from Clean Catch/Voided Escherichia coli S S S S S S S S        No further follow up needed from EDPD Team.     Vic Lew, PharmD

## 2024-02-29 ENCOUNTER — PHARMACY VISIT (OUTPATIENT)
Dept: PHARMACY | Facility: CLINIC | Age: 39
End: 2024-02-29
Payer: MEDICAID

## 2024-02-29 PROCEDURE — RXMED WILLOW AMBULATORY MEDICATION CHARGE

## 2024-02-29 RX ORDER — TRAMADOL HYDROCHLORIDE 50 MG/1
50 TABLET ORAL EVERY 6 HOURS PRN
Qty: 8 TABLET | Refills: 0 | OUTPATIENT
Start: 2024-02-10

## 2024-02-29 RX ORDER — PROMETHAZINE HYDROCHLORIDE 25 MG/1
25 TABLET ORAL EVERY 6 HOURS PRN
Qty: 12 TABLET | Refills: 0 | OUTPATIENT
Start: 2024-02-11

## 2024-02-29 RX ORDER — CEPHALEXIN 500 MG/1
500 CAPSULE ORAL 4 TIMES DAILY
Qty: 28 CAPSULE | Refills: 0 | OUTPATIENT
Start: 2024-02-11

## 2024-02-29 RX ORDER — METOCLOPRAMIDE 10 MG/1
10 TABLET ORAL EVERY 6 HOURS PRN
Qty: 12 TABLET | Refills: 0 | OUTPATIENT
Start: 2024-02-11

## 2024-03-08 PROCEDURE — RXMED WILLOW AMBULATORY MEDICATION CHARGE

## 2024-03-12 ENCOUNTER — PHARMACY VISIT (OUTPATIENT)
Dept: PHARMACY | Facility: CLINIC | Age: 39
End: 2024-03-12
Payer: MEDICAID

## 2024-12-03 ENCOUNTER — APPOINTMENT (OUTPATIENT)
Dept: PRIMARY CARE | Facility: CLINIC | Age: 39
End: 2024-12-03
Payer: COMMERCIAL

## 2024-12-12 ENCOUNTER — APPOINTMENT (OUTPATIENT)
Dept: PRIMARY CARE | Facility: CLINIC | Age: 39
End: 2024-12-12
Payer: COMMERCIAL

## 2025-02-07 ENCOUNTER — APPOINTMENT (OUTPATIENT)
Dept: PRIMARY CARE | Facility: CLINIC | Age: 40
End: 2025-02-07
Payer: COMMERCIAL